# Patient Record
Sex: MALE | Race: WHITE | NOT HISPANIC OR LATINO | Employment: FULL TIME | URBAN - METROPOLITAN AREA
[De-identification: names, ages, dates, MRNs, and addresses within clinical notes are randomized per-mention and may not be internally consistent; named-entity substitution may affect disease eponyms.]

---

## 2020-01-05 ENCOUNTER — ANCILLARY PROCEDURE (OUTPATIENT)
Dept: GENERAL RADIOLOGY | Facility: CLINIC | Age: 60
End: 2020-01-05
Attending: PHYSICIAN ASSISTANT
Payer: COMMERCIAL

## 2020-01-05 ENCOUNTER — OFFICE VISIT (OUTPATIENT)
Dept: URGENT CARE | Facility: URGENT CARE | Age: 60
End: 2020-01-05
Payer: COMMERCIAL

## 2020-01-05 VITALS
SYSTOLIC BLOOD PRESSURE: 110 MMHG | OXYGEN SATURATION: 98 % | RESPIRATION RATE: 16 BRPM | HEART RATE: 67 BPM | TEMPERATURE: 97.1 F | DIASTOLIC BLOOD PRESSURE: 80 MMHG | WEIGHT: 238 LBS

## 2020-01-05 DIAGNOSIS — R50.9 FEVER IN ADULT: ICD-10-CM

## 2020-01-05 DIAGNOSIS — J22 LOWER RESP. TRACT INFECTION: Primary | ICD-10-CM

## 2020-01-05 DIAGNOSIS — R05.9 COUGH: ICD-10-CM

## 2020-01-05 DIAGNOSIS — Z96.641 S/P HIP REPLACEMENT, RIGHT: ICD-10-CM

## 2020-01-05 DIAGNOSIS — R06.02 SOB (SHORTNESS OF BREATH): ICD-10-CM

## 2020-01-05 PROCEDURE — 99204 OFFICE O/P NEW MOD 45 MIN: CPT | Performed by: PHYSICIAN ASSISTANT

## 2020-01-05 PROCEDURE — 71046 X-RAY EXAM CHEST 2 VIEWS: CPT

## 2020-01-05 RX ORDER — DIAZEPAM 5 MG
5 TABLET ORAL
COMMUNITY
Start: 2019-12-28

## 2020-01-05 RX ORDER — ALBUTEROL SULFATE 90 UG/1
2 AEROSOL, METERED RESPIRATORY (INHALATION) EVERY 6 HOURS PRN
Qty: 18 G | Refills: 0 | Status: SHIPPED | OUTPATIENT
Start: 2020-01-05 | End: 2020-01-29

## 2020-01-05 RX ORDER — OMEPRAZOLE 10 MG/1
10 CAPSULE, DELAYED RELEASE ORAL
COMMUNITY
Start: 2019-11-22

## 2020-01-05 RX ORDER — AMOXICILLIN 250 MG
1-3 CAPSULE ORAL
COMMUNITY
Start: 2019-12-28

## 2020-01-05 RX ORDER — ESCITALOPRAM OXALATE 10 MG/1
10 TABLET ORAL
COMMUNITY
Start: 2019-11-25

## 2020-01-05 RX ORDER — CIPROFLOXACIN 500 MG/1
TABLET, FILM COATED ORAL
COMMUNITY
Start: 2019-01-15

## 2020-01-05 RX ORDER — ACETAMINOPHEN 500 MG
1000 TABLET ORAL
COMMUNITY
Start: 2019-12-28

## 2020-01-05 RX ORDER — BUPROPION HYDROCHLORIDE 150 MG/1
150 TABLET ORAL
COMMUNITY
Start: 2019-06-11

## 2020-01-05 RX ORDER — ASPIRIN 81 MG/1
81 TABLET ORAL
COMMUNITY
Start: 2019-12-28

## 2020-01-05 RX ORDER — ONDANSETRON 4 MG/1
4 TABLET, ORALLY DISINTEGRATING ORAL
COMMUNITY
Start: 2019-12-28

## 2020-01-05 RX ORDER — BENZONATATE 200 MG/1
200 CAPSULE ORAL 3 TIMES DAILY PRN
Qty: 21 CAPSULE | Refills: 0 | Status: SHIPPED | OUTPATIENT
Start: 2020-01-05 | End: 2020-01-12

## 2020-01-05 RX ORDER — CELECOXIB 200 MG/1
200 CAPSULE ORAL
COMMUNITY
Start: 2019-12-28

## 2020-01-05 RX ORDER — FINASTERIDE 5 MG/1
5 TABLET, FILM COATED ORAL
COMMUNITY
Start: 2019-06-11

## 2020-01-05 RX ORDER — AZITHROMYCIN 250 MG/1
TABLET, FILM COATED ORAL
Qty: 6 TABLET | Refills: 0 | Status: SHIPPED | OUTPATIENT
Start: 2020-01-05 | End: 2020-01-10

## 2020-01-05 NOTE — PROGRESS NOTES
"      SUBJECTIVE:    Hernandez Carreno is a 59 y.o. male, with a hx of recent total right hip arthroplasty on 12/27/19, who presents to  today requesting chest X-Ray. He c/o cough, SOB and subjective fever.     HPI: Patient states he was seen by PCP on 12/31/19-- 2 days after he first developed  Fever sxs, productive cough, chest tightness  with coughing, fatigue and SOB. Patient states PCP advised he get a chest x-ray if cough  Failed to improve or  Worsened.  Patient reports cough is slowing worsening and has become productive.       ROS:     CONSTITUTIONAL: Positive for intermittent subjective fever, chills and malaise   HEENT: Positive nasal and sinus congestion.   RESP: Positive for progressively worsening cough x 5 days. Patient states cough was  Initially dry and harsh but is now getting loose and productive. Denies any formal dx of asthma or COPD. Denies any hemoptysis   CARDIAC:  Denies any CP. Denies any LE pain, swelling, heat or redness. edema. Denies any sense of racing or irregular heartbeat. Denies any syncope or near   Syncope.   GI: Denies any N/V/D. No abdominal pain. Normal BM's  SKIN: Denies rash  NEURO: Denies any HA, mental status changes or lethargy.   URINARY: Reports good PO fluid intake and normal UOP.  Denies any dysuria or UTI sxs.   RHEUM: Janusz any red, warm or swollen joints.  HEME: Denies any personal hx of malignancy. Denies any personal or fmhx of DVT or PE   ENDOCRINE: Denies any hx of   DM  MUS/SKEL:  Positive for recent right hip replacement devon 12/27/19. Patient states, \"My hip feels great.\"       PMHX:  Osteoarthritis, GERD, ED, depression    FMHX:  Denies any fmhx  Of DVT/PE or known coagulopathy. Father passed age 87 from  CLL. Denies any other significant illness in primary family members   SOC HX: Engaged  To be . 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        Current Outpatient Medications   Medication     acetaminophen (TYLENOL) 500 MG tablet     aspirin 81 MG EC " tablet     buPROPion (WELLBUTRIN XL) 150 MG 24 hr tablet     celecoxib (CELEBREX) 200 MG capsule     ciprofloxacin (CIPRO) 500 MG tablet     diazepam (VALIUM) 5 MG tablet     escitalopram (LEXAPRO) 10 MG tablet     finasteride (PROSCAR) 5 MG tablet     magnesium hydroxide (MILK OF MAGNESIA) 400 MG/5ML suspension     omeprazole (PRILOSEC) 10 MG DR capsule     ondansetron (ZOFRAN-ODT) 4 MG ODT tab     senna-docusate (SENOKOT-S/PERICOLACE) 8.6-50 MG tablet     No current facility-administered medications for this visit.      No Known Allergies       OBJECTIVE:    /80   Pulse 67   Temp 97.1  F (36.2  C) (Tympanic)   Resp 16   Wt 108 kg (238 lb)   SpO2 98%       General appearance: alert and no apparent distress   Skin color is pink and without rash.  HEENT:   Conjunctiva not injected.  Sclera clear.  Left TM is normal: no effusions, no erythema, and normal landmarks.  Right TM is normal: no effusions, no erythema, and normal landmarks.  Nasal mucosa is congested   Oropharyngeal exam is normal: no lesions, erythema, adenopathy or exudate.  NECK: Trachea is midline. Neck is supple, FROM with no adenopathy  CARDIAC:NORMAL - regular rate and rhythm without murmur.  RESP: No increased WOB. Able to speak in full sentences. No stridor. Few rhonchi that clear with coughing. No rales. Sounds tight with coughing/harsh cough but no stephanie wheezes. Still moving  Air well into  All  Listening  Areas  Including  Bases bilaterally. .   ABDOMEN: Abdomen soft, non-tender. BS normal. No masses, organomegaly  EXTREMITIES:  Lower legs are equal in size bilaterally. No LE redness, heat, swelling or pain on exam today  NEURO: Alert and oriented.  Normal speech and mentation.  CN II/XII grossly intact.  Gait within normal limits.        CXR ordered to assess for possible pneumonia: I reviewed my impression (No acute consolidations, infiltrates or effusions. No acute findings), along with actual x-ray images, with patient during  office visit today.  Radiologist over-read pending. Patient will need to be called if there are any acute findings on radiologist over-read.         ASSESSMENT/PLAN:    (J22) Lower resp. tract infection  (primary encounter diagnosis)  MDM: Progressive cough that developed after total hip arthroplasty. Patient describes onset of subjective fever and nasal/sinus congestion at same time  He developed cough and SOB. Given recent surgical hx, DVT/PE is considered.  My medical suspicion is low, however, patient is advised that remains a possible cause for SOB following orthopedic hip surgery and immobilization.  I do not find any additional DVT/PE risk factors on review or any physical exam findings to increase my medical suspicion. CXR negative for pneumonia or effusion by my read. Plan is as outlined below:       Plan: azithromycin (ZITHROMAX) 250 MG tablet    Below is reviewed with patient during  Office  Visit and provided in printed  Form for home review:        January 5, 2020  URGENT CARE OFFICE VISIT FOLLOW-UP PLAN:     Due to your 5 day history of cough, with progressive worsening and subjective fever, you were evaluated for possible pneumonia. I do not see any evidence pneumonia on your chest x-ray today. Given your interval worsening, I do suspect you have a bacterial bronchitis. Please follow the the  Below treatment plan.     We also discussed--your recent total hip replacement surgery (and the associated period of immobilization) is a risk for deep vein blood clot (DVT) and pulmonary embolism (PE).  A pulmonary embolism can cause sudden shortness of breath.     On review of your personal and family medical history, I have not identified any additional risk factors for you to have DVT/PE.     Because your cough and shortness of breath began with upper respiratory symptoms (cough, nasal & sinus congestion, subjective fever), I suspect upper respiratory infection is the cause of your shortness of breath.     As  "we discussed today, I am unable to screen you further for pulmonary embolism here today. Therefore, if you wish to be screened further, you should go to the emergency room.     1.  Start the Z-Pack (azithromycin)     2. You may use the Tessalon Capsules as needed  For cough       3. Try using the Albuterol inhaler for cough/wheezing/harsh cough    4. If you do not improve within 2 days, with treatment provided here today, see your primary care provider.     5. Go directly to the emergency room if you have any increased work of breathing, chest pain, fever above 100.4, racing or irregular heartbeats, lower leg redness, heat, swelling, fainting, near fainting, lethargy (as we discussed today), are unable to drink enough fluids to urinate at least 3 times daily, if you develop any vomiting, diarrhea, increased weakness, rash or any new symptoms.      In addition to the above, URI \"red flag\" signs and sxs are reviewed with pt both verbally and by way of printed educational material for home review.  Pt verbalizes understanding of and agrees to the above plan.       (R05) Cough  Plan: XR Chest 2 Views, benzonatate (TESSALON) 200 MG        capsule, albuterol (PROAIR HFA/PROVENTIL         HFA/VENTOLIN HFA) 108 (90 Base) MCG/ACT inhaler      (R06.02) SOB (shortness of breath)  Plan: XR Chest 2 Views      (Z96.641) S/P hip replacement, right  Plan: XR Chest 2 Views      (R50.9) Fever in adult  Plan: XR Chest 2 Views        "

## 2020-01-05 NOTE — PATIENT INSTRUCTIONS
Patient Education     Bronchitis, Antibiotic Treatment (Adult)    Bronchitis is an infection of the air passages (bronchial tubes) in your lungs. It often occurs when you have a cold. This illness is contagious during the first few days and is spread through the air by coughing and sneezing, or by direct contact (touching the sick person and then touching your own eyes, nose, or mouth).  Symptoms of bronchitis include cough with mucus (phlegm) and low-grade fever. Bronchitis usually lasts 7 to 14 days. Mild cases can be treated with simple home remedies. More severe infection is treated with an antibiotic.  Home care  Follow these guidelines when caring for yourself at home:    If your symptoms are severe, rest at home for the first 2 to 3 days. When you go back to your usual activities, don't let yourself get too tired.    Don't smoke. Also stay away from secondhand smoke.    You may use over-the-counter medicines to control fever or pain, unless another medicine was prescribed. If you have chronic liver or kidney disease or have ever had a stomach ulcer or gastrointestinal bleeding, talk with your healthcare provider before using these medicines. Also talk to your provider if you are taking medicine to prevent blood clots. Aspirin should never be given to anyone younger than 18 who is ill with a viral infection or fever. It may cause severe liver or brain damage.    Your appetite may be low, so a light diet is fine. Stay well hydrated by drinking 6 to 8 glasses of fluids per day. This includes water, soft drinks, sports drinks, juices, tea, or soup. Extra fluids will help loosen mucus in your nose and lungs.    Over-the-counter cough, cold, and sore-throat medicines will not shorten the length of the illness, but they may be helpful to reduce your symptoms. Don't use decongestants if you have high blood pressure.    Finish all antibiotic medicine. Do this even if you are feeling better after only a few  days.  Follow-up care  Follow up with your healthcare provider, or as advised. If you had an X-ray or ECG (electrocardiogram), a specialist will review it. You will be told of any new test results that may affect your care.  If you are age 65 or older, if you smoke, or if you have a chronic lung disease or condition that affects your immune system, ask your healthcare provider about getting a pneumococcal vaccine and a yearly flu shot (influenza vaccine).  When to seek medical advice  Call your healthcare provider right away if any of these occur:    Fever of 100.4 F (38 C) or higher, or as directed by your healthcare provider    Coughing up more sputum    Weakness, drowsiness, headache, facial pain, ear pain, or a stiff neck  Call 911  Call 911 if any of these occur.    Coughing up blood    Weakness, drowsiness, headache, or stiff neck that get worse    Trouble breathing, wheezing, or pain with breathing  Date Last Reviewed: 6/1/2018 2000-2019 [employer]. [address] [place] [address]. All rights reserved. This information is not intended as a substitute for professional medical care. Always follow your healthcare professional's instructions.      January 5, 2020  URGENT CARE OFFICE VISIT FOLLOW-UP PLAN:     Due to your 5 day history of cough, with progressive worsening and subjective fever, you were evaluated for possible pneumonia. I do not see any evidence pneumonia on your chest x-ray today. Given your interval worsening, I do suspect you have a bacterial bronchitis. Please follow the the  Below treatment plan.     We also discussed--your recent total hip replacement surgery (and the associated period of immobilization) is a risk for deep vein blood clot (DVT) and pulmonary embolism (PE).  A pulmonary embolism can cause sudden shortness of breath.     On review of your personal and family medical history, I have not identified any additional risk factors for you to have DVT/PE.      Because your cough and shortness of breath began with upper respiratory symptoms (cough, nasal & sinus congestion, subjective fever), I suspect upper respiratory infection is the cause of your shortness of breath.     As we discussed today, I am unable to screen you further for pulmonary embolism here today. Therefore, if you wish to be screened further, you should go to the emergency room.     1.  Start the Z-Pack (azithromycin)     2. You may use the Tessalon Capsules as needed  For cough       3. Try using the Albuterol inhaler for cough/wheezing/harsh cough    4. If you do not improve within 2 days, with treatment provided here today, see your primary care provider.     5. Go directly to the emergency room if you have any increased work of breathing, chest pain, fever above 100.4, racing or irregular heartbeats, lower leg redness, heat, swelling, fainting, near fainting, lethargy (as we discussed today), are unable to drink enough fluids to urinate at least 3 times daily, if you develop any vomiting, diarrhea, increased weakness, rash or any new symptoms.

## 2020-01-29 DIAGNOSIS — R05.9 COUGH: ICD-10-CM

## 2020-01-29 RX ORDER — ALBUTEROL SULFATE 90 UG/1
2 AEROSOL, METERED RESPIRATORY (INHALATION) EVERY 6 HOURS PRN
Qty: 18 G | Refills: 0 | Status: SHIPPED | OUTPATIENT
Start: 2020-01-29

## 2020-01-29 NOTE — TELEPHONE ENCOUNTER
"Requested Prescriptions   Signed Prescriptions Disp Refills    albuterol (PROAIR HFA/PROVENTIL HFA/VENTOLIN HFA) 108 (90 Base) MCG/ACT inhaler 18 g 0     Sig: Inhale 2 puffs into the lungs every 6 hours as needed for shortness of breath / dyspnea or wheezing       Asthma Maintenance Inhalers - Anticholinergics Passed - 1/29/2020 12:18 PM        Passed - Patient is age 12 years or older        Passed - Recent (12 mo) or future (30 days) visit within the authorizing provider's specialty     Patient has had an office visit with the authorizing provider or a provider within the authorizing providers department within the previous 12 mos or has a future within next 30 days. See \"Patient Info\" tab in inbasket, or \"Choose Columns\" in Meds & Orders section of the refill encounter.              Passed - Medication is active on med list          "

## 2021-04-05 ENCOUNTER — IMMUNIZATION (OUTPATIENT)
Dept: NURSING | Facility: CLINIC | Age: 61
End: 2021-04-05
Payer: COMMERCIAL

## 2021-04-05 PROCEDURE — 91300 PR COVID VAC PFIZER DIL RECON 30 MCG/0.3 ML IM: CPT

## 2021-04-05 PROCEDURE — 0001A PR COVID VAC PFIZER DIL RECON 30 MCG/0.3 ML IM: CPT

## 2021-04-11 ENCOUNTER — HEALTH MAINTENANCE LETTER (OUTPATIENT)
Age: 61
End: 2021-04-11

## 2021-04-26 ENCOUNTER — IMMUNIZATION (OUTPATIENT)
Dept: NURSING | Facility: CLINIC | Age: 61
End: 2021-04-26
Attending: INTERNAL MEDICINE
Payer: COMMERCIAL

## 2021-04-26 PROCEDURE — 0002A PR COVID VAC PFIZER DIL RECON 30 MCG/0.3 ML IM: CPT

## 2021-04-26 PROCEDURE — 91300 PR COVID VAC PFIZER DIL RECON 30 MCG/0.3 ML IM: CPT

## 2021-09-25 ENCOUNTER — HEALTH MAINTENANCE LETTER (OUTPATIENT)
Age: 61
End: 2021-09-25

## 2021-12-01 ENCOUNTER — HOSPITAL ENCOUNTER (EMERGENCY)
Facility: CLINIC | Age: 61
Discharge: LEFT BEFORE DISCHARGE | End: 2021-12-01
Payer: COMMERCIAL

## 2021-12-01 ENCOUNTER — OFFICE VISIT (OUTPATIENT)
Dept: URGENT CARE | Facility: URGENT CARE | Age: 61
End: 2021-12-01
Payer: COMMERCIAL

## 2021-12-01 VITALS
WEIGHT: 235 LBS | RESPIRATION RATE: 16 BRPM | DIASTOLIC BLOOD PRESSURE: 92 MMHG | SYSTOLIC BLOOD PRESSURE: 148 MMHG | TEMPERATURE: 98.1 F | HEIGHT: 74 IN | BODY MASS INDEX: 30.16 KG/M2 | OXYGEN SATURATION: 100 %

## 2021-12-01 VITALS
OXYGEN SATURATION: 98 % | TEMPERATURE: 98 F | HEART RATE: 78 BPM | SYSTOLIC BLOOD PRESSURE: 145 MMHG | DIASTOLIC BLOOD PRESSURE: 95 MMHG | RESPIRATION RATE: 20 BRPM

## 2021-12-01 DIAGNOSIS — R51.9 ACUTE NONINTRACTABLE HEADACHE, UNSPECIFIED HEADACHE TYPE: Primary | ICD-10-CM

## 2021-12-01 PROCEDURE — U0005 INFEC AGEN DETEC AMPLI PROBE: HCPCS | Performed by: PHYSICIAN ASSISTANT

## 2021-12-01 PROCEDURE — 99214 OFFICE O/P EST MOD 30 MIN: CPT | Performed by: PHYSICIAN ASSISTANT

## 2021-12-01 PROCEDURE — U0003 INFECTIOUS AGENT DETECTION BY NUCLEIC ACID (DNA OR RNA); SEVERE ACUTE RESPIRATORY SYNDROME CORONAVIRUS 2 (SARS-COV-2) (CORONAVIRUS DISEASE [COVID-19]), AMPLIFIED PROBE TECHNIQUE, MAKING USE OF HIGH THROUGHPUT TECHNOLOGIES AS DESCRIBED BY CMS-2020-01-R: HCPCS | Performed by: PHYSICIAN ASSISTANT

## 2021-12-01 ASSESSMENT — MIFFLIN-ST. JEOR: SCORE: 1930.7

## 2021-12-01 NOTE — PROGRESS NOTES
Assessment & Plan     1. Acute nonintractable headache, unspecified headache type  Patient with exertional and positional headache ongoing for the past 2 days.  When he coughs or bears down in clinic, this is the only time his headache is present.   I cannot order imaging for him today, and unable to go to [facility] as he is not a [facility] patient. Advised calling PCP and if unable to schedule him, to ER.   - Symptomatic COVID-19 Virus (Coronavirus) by PCR Nose      [facility] [place]    CHIEF COMPLAINT:   Chief Complaint   Patient presents with     Urgent Care     URI     cough pt had his COVID 3 covid shot a few days ago      Pipe Ng is a 61 year old male who presents to clinic today for evaluation headache.  Patient has been coughing for the past week.  He thinks he picked it up while he was in the hospital with his daughter.  Patient reports for the past 2 days he has had positional and exertional headache.  Reports that if he coughs, or bears down during a bowel movement or exercises he develops a headache in the left occipital lobe.  Headache is not present if he is sitting still.  Headache is worse with bending forward and lying down.  He endorses having some nausea without vomiting.  He denies having fever, chills, chest pain, shortness of breath, numbness or tingling to his extremities, dizziness, gait changes etc.  He has history of CVA from [facility].  He is up-to-date on his COVID-19 vaccinations.      No past medical history on file.  No past surgical history on file.  Social History     Tobacco Use     Smoking status: Not on file     Smokeless tobacco: Not on file   Substance Use Topics     Alcohol use: Not on file     Current Outpatient Medications   Medication     acetaminophen (TYLENOL) 500 MG tablet     albuterol (PROAIR HFA/PROVENTIL HFA/VENTOLIN HFA) 108 (90 Base) MCG/ACT inhaler     aspirin 81 MG EC tablet     buPROPion (WELLBUTRIN XL) 150 MG 24 hr tablet      celecoxib (CELEBREX) 200 MG capsule     ciprofloxacin (CIPRO) 500 MG tablet     diazepam (VALIUM) 5 MG tablet     escitalopram (LEXAPRO) 10 MG tablet     finasteride (PROSCAR) 5 MG tablet     magnesium hydroxide (MILK OF MAGNESIA) 400 MG/5ML suspension     omeprazole (PRILOSEC) 10 MG DR capsule     ondansetron (ZOFRAN-ODT) 4 MG ODT tab     senna-docusate (SENOKOT-S/PERICOLACE) 8.6-50 MG tablet     No current facility-administered medications for this visit.     No Known Allergies    10 point ROS of systems were all negative except for pertinent positives noted in my HPI.      Exam:   BP (!) 145/95   Pulse 78   Temp 98  F (36.7  C)   Resp 20   SpO2 98%   Constitutional: healthy, alert and no distress  Head: Normocephalic, atraumatic.  Eyes: conjunctiva clear, no drainage  ENT: TMs clear and shiny lucas, nasal mucosa pink and moist, throat without tonsillar hypertrophy or erythema  Neck: neck is supple, no cervical lymphadenopathy or nuchal rigidity  Cardiovascular: RRR  Respiratory: CTA bilaterally, no rhonchi or rales  Gastrointestinal: soft and nontender  Skin: no rashes  Neurologic: Speech clear, gait normal. Neg Pronator. Strength and sensation intact B/L.

## 2021-12-01 NOTE — ED TRIAGE NOTES
A&Ox4. ABC's intact. Pt c/o HA with coughing and bending over. Cough for about a week.  HA for the past 3 days.  Pain 6/10 when coughing/bending    Hx of stroke

## 2021-12-02 LAB — SARS-COV-2 RNA RESP QL NAA+PROBE: NEGATIVE

## 2022-05-07 ENCOUNTER — HEALTH MAINTENANCE LETTER (OUTPATIENT)
Age: 62
End: 2022-05-07

## 2023-01-07 ENCOUNTER — HEALTH MAINTENANCE LETTER (OUTPATIENT)
Age: 63
End: 2023-01-07

## 2023-03-21 DIAGNOSIS — R06.00 DYSPNEA: Primary | ICD-10-CM

## 2023-07-15 ENCOUNTER — HEALTH MAINTENANCE LETTER (OUTPATIENT)
Age: 63
End: 2023-07-15

## 2024-09-01 ENCOUNTER — HEALTH MAINTENANCE LETTER (OUTPATIENT)
Age: 64
End: 2024-09-01